# Patient Record
Sex: FEMALE | Race: WHITE
[De-identification: names, ages, dates, MRNs, and addresses within clinical notes are randomized per-mention and may not be internally consistent; named-entity substitution may affect disease eponyms.]

---

## 2022-03-19 PROBLEM — G50.0 TRIGEMINAL NEURALGIA OF LEFT SIDE OF FACE: Status: ACTIVE | Noted: 2019-05-28

## 2022-03-19 PROBLEM — Z82.49 FAMILY HISTORY OF CORONARY ARTERY DISEASE: Status: ACTIVE | Noted: 2019-09-11

## 2022-03-20 PROBLEM — R53.82 CHRONIC FATIGUE: Status: ACTIVE | Noted: 2019-09-11

## 2022-06-13 ENCOUNTER — PATIENT MESSAGE (OUTPATIENT)
Dept: INTERNAL MEDICINE CLINIC | Facility: CLINIC | Age: 52
End: 2022-06-13

## 2022-06-13 RX ORDER — CARBAMAZEPINE 200 MG/1
200 TABLET ORAL 3 TIMES DAILY
Qty: 90 TABLET | Refills: 3 | Status: CANCELLED | OUTPATIENT
Start: 2022-06-13

## 2022-06-13 NOTE — TELEPHONE ENCOUNTER
----- Message from Luismaurizio BahAlejandro sent at 6/13/2022  3:17 PM EDT -----  Subject: Refill Request    QUESTIONS  Name of Medication? carBAMazepine (TEGRETOL) 200 MG tablet  Patient-reported dosage and instructions? one tablet 3 times a day   How many days do you have left? 3  Preferred Pharmacy? [address] phone number (if available)? [phone]  ---------------------------------------------------------------------------  --------------  Gene Nancy BOYKIN  What is the best way for the office to contact you? OK to leave message on   voicemail  Preferred Call Back Phone Number? [phone]  ---------------------------------------------------------------------------  --------------  SCRIPT ANSWERS  Relationship to Patient?  Self

## 2022-06-14 RX ORDER — CARBAMAZEPINE 200 MG/1
200 TABLET ORAL 3 TIMES DAILY
Qty: 90 TABLET | Refills: 2 | Status: SHIPPED | OUTPATIENT
Start: 2022-06-14 | End: 2022-09-13 | Stop reason: SDUPTHER

## 2022-06-14 NOTE — TELEPHONE ENCOUNTER
From: Leslye Parrish  To: Dr. Stewart Fearin2022 4:40 PM EDT  Subject: Request for refill of carbamazepine    Hi Dr. Romayne Fortune,    I wanted to request a refill of the carbamazepine I am currently taking as I just noticed I am about to run out and I am out of refills. I am not needing to come in for a yearly appointment until around October, so wanted to see if you could refill until I plan to come in for yearly appt?  Thank you very much, Loy Bay

## 2022-12-13 ENCOUNTER — TELEPHONE (OUTPATIENT)
Dept: INTERNAL MEDICINE CLINIC | Facility: CLINIC | Age: 52
End: 2022-12-13

## 2022-12-13 ENCOUNTER — OFFICE VISIT (OUTPATIENT)
Dept: INTERNAL MEDICINE CLINIC | Facility: CLINIC | Age: 52
End: 2022-12-13
Payer: COMMERCIAL

## 2022-12-13 VITALS
TEMPERATURE: 97.5 F | WEIGHT: 122.4 LBS | HEIGHT: 67 IN | SYSTOLIC BLOOD PRESSURE: 155 MMHG | DIASTOLIC BLOOD PRESSURE: 89 MMHG | HEART RATE: 87 BPM | BODY MASS INDEX: 19.21 KG/M2 | RESPIRATION RATE: 18 BRPM

## 2022-12-13 DIAGNOSIS — G50.0 TRIGEMINAL NEURALGIA OF LEFT SIDE OF FACE: ICD-10-CM

## 2022-12-13 DIAGNOSIS — Z82.49 FAMILY HISTORY OF ISCHEMIC HEART DISEASE AND OTHER DISEASES OF THE CIRCULATORY SYSTEM: ICD-10-CM

## 2022-12-13 DIAGNOSIS — E55.9 VITAMIN D DEFICIENCY: ICD-10-CM

## 2022-12-13 DIAGNOSIS — Z00.00 ROUTINE GENERAL MEDICAL EXAMINATION AT A HEALTH CARE FACILITY: Primary | ICD-10-CM

## 2022-12-13 DIAGNOSIS — Z82.49 FAMILY HISTORY OF ISCHEMIC HEART DISEASE AND OTHER DISEASES OF THE CIRCULATORY SYSTEM: Primary | ICD-10-CM

## 2022-12-13 DIAGNOSIS — Z12.4 CERVICAL CANCER SCREENING: ICD-10-CM

## 2022-12-13 DIAGNOSIS — R53.82 CHRONIC FATIGUE, UNSPECIFIED: ICD-10-CM

## 2022-12-13 DIAGNOSIS — L82.1 SEBORRHEIC KERATOSES: ICD-10-CM

## 2022-12-13 PROBLEM — J44.9 CHRONIC OBSTRUCTIVE PULMONARY DISEASE, UNSPECIFIED (HCC): Status: ACTIVE | Noted: 2022-12-13

## 2022-12-13 PROCEDURE — 99396 PREV VISIT EST AGE 40-64: CPT | Performed by: INTERNAL MEDICINE

## 2022-12-13 RX ORDER — TRAMADOL HYDROCHLORIDE 50 MG/1
TABLET ORAL
COMMUNITY
Start: 2022-10-27 | End: 2022-12-13 | Stop reason: SDUPTHER

## 2022-12-13 RX ORDER — NORGESTIMATE AND ETHINYL ESTRADIOL 0.25-0.035
1 KIT ORAL DAILY
Qty: 1 PACKET | Refills: 5 | Status: SHIPPED | OUTPATIENT
Start: 2022-12-13

## 2022-12-13 RX ORDER — TRAMADOL HYDROCHLORIDE 50 MG/1
50 TABLET ORAL DAILY PRN
Qty: 30 TABLET | Refills: 5 | Status: SHIPPED | OUTPATIENT
Start: 2022-12-13 | End: 2023-01-12

## 2022-12-13 RX ORDER — CARBAMAZEPINE 200 MG/1
200 TABLET ORAL 3 TIMES DAILY
Qty: 90 TABLET | Refills: 5 | Status: SHIPPED | OUTPATIENT
Start: 2022-12-13

## 2022-12-13 RX ORDER — GABAPENTIN 300 MG/1
300 CAPSULE ORAL 2 TIMES DAILY
Qty: 60 CAPSULE | Refills: 1 | Status: SHIPPED | OUTPATIENT
Start: 2022-12-13 | End: 2023-02-11

## 2022-12-13 SDOH — ECONOMIC STABILITY: FOOD INSECURITY: WITHIN THE PAST 12 MONTHS, THE FOOD YOU BOUGHT JUST DIDN'T LAST AND YOU DIDN'T HAVE MONEY TO GET MORE.: NEVER TRUE

## 2022-12-13 SDOH — ECONOMIC STABILITY: FOOD INSECURITY: WITHIN THE PAST 12 MONTHS, YOU WORRIED THAT YOUR FOOD WOULD RUN OUT BEFORE YOU GOT MONEY TO BUY MORE.: NEVER TRUE

## 2022-12-13 ASSESSMENT — PATIENT HEALTH QUESTIONNAIRE - PHQ9
SUM OF ALL RESPONSES TO PHQ QUESTIONS 1-9: 0
1. LITTLE INTEREST OR PLEASURE IN DOING THINGS: 0
2. FEELING DOWN, DEPRESSED OR HOPELESS: 0
SUM OF ALL RESPONSES TO PHQ QUESTIONS 1-9: 0
SUM OF ALL RESPONSES TO PHQ QUESTIONS 1-9: 0
SUM OF ALL RESPONSES TO PHQ9 QUESTIONS 1 & 2: 0
SUM OF ALL RESPONSES TO PHQ QUESTIONS 1-9: 0

## 2022-12-13 ASSESSMENT — SOCIAL DETERMINANTS OF HEALTH (SDOH): HOW HARD IS IT FOR YOU TO PAY FOR THE VERY BASICS LIKE FOOD, HOUSING, MEDICAL CARE, AND HEATING?: NOT HARD AT ALL

## 2022-12-13 NOTE — TELEPHONE ENCOUNTER
Contact this patient. We forgot to discuss a referral to gastroenterology for a screening colonoscopy.   If she is agreeable please make a referral to [facility]

## 2022-12-13 NOTE — PROGRESS NOTES
12/13/2022 5:22 PM  Location:[facility] [address] [facility]INE  [place]  Patient #:  [id number]  YOB: 1970          YOUR LAST HEMOGLOBIN A1CS:   No results found for: HBA1C, [id number]    YOUR LAST LIPID PROFILE: No results found for: CHOL, CHOLPOCT, CHOLX, CHLST, CHOLV, HDL, HDLPOC, HDLC, LDL, LDLC, VLDLC, VLDL, TGLX, TRIGL      Lab Results   Component Value Date/Time    GFRAA 120 02/01/2022 03:10 PM    BUN 9 02/01/2022 03:10 PM     02/01/2022 03:10 PM    K 4.2 02/01/2022 03:10 PM    CL 99 02/01/2022 03:10 PM    CO2 24 02/01/2022 03:10 PM           History of Present Illness     Chief Complaint   Patient presents with    Annual Exam     Pt presents to the office today for their annual exam.      Discuss Medications     Want to discuss maybe starting on cholesterol medication due to family history of  high cholesterol and her dad had another heart attack over in the summer    Skin Problem     Have a place on the upper part of the chest that is starting to itch; would like to see if the doctor could freeze it off    Dental Pain     Want to see what the doctor thoughts are about increasing the tegretol or is there another medication she might can try for the neuralgia      And this patient is here for her annual preventive screen. She continues to have left facial pain which is thought to be due to trigeminal neuralgia in the past.  She has had a full ENT and neurologic work-up and has been maintained on Tegretol 600 mg a day. She has tried taking an extra 1 which has helped some. She has been with Lyrica and gabapentin in the distant past but does not remember if there were any side effects from these. She has a small skin lesion on her anterior chest which is bothersome. Of note is that her father experienced a myocardial infarction at age 76 this summer. He had had a previous MI in his mid 46s and she is concerned about her cardiovascular risk.   She denies chest pain or shortness of breath. She continues to be on birth control and does have regular menstrual periods and denies any hot flashes. This patient presents for the annual preventive exam. Patient denies any problems other than what is noted  In ROS     Ms. Deepthi Briceño is a 47 y.o. female  who presents for ov      No Known Allergies  Past Medical History:   Diagnosis Date    Calculus of kidney      Social History     Socioeconomic History    Marital status:      Spouse name: None    Number of children: None    Years of education: None    Highest education level: None   Tobacco Use    Smoking status: Never    Smokeless tobacco: Never   Substance and Sexual Activity    Alcohol use: Yes    Drug use: No     Social Determinants of Health     Financial Resource Strain: Low Risk     Difficulty of Paying Living Expenses: Not hard at all   Food Insecurity: No Food Insecurity    Worried About [email or web address] in the Last Year: Never true    [facility] in the Last Year: Never true     Past Surgical History:   Procedure Laterality Date    BREAST SURGERY  2006    DILATION AND CURETTAGE OF UTERUS      LITHOTRIPSY  08/2010     Current Outpatient Medications   Medication Sig Dispense Refill    carBAMazepine (TEGRETOL) 200 MG tablet Take 1 tablet by mouth 3 times daily 90 tablet 5    traMADol (ULTRAM) 50 MG tablet Take 1 tablet by mouth daily as needed for Pain for up to 30 days. 30 tablet 5    norgestimate-ethinyl estradiol (ORTHO-CYCLEN) 0.25-35 MG-MCG per tablet Take 1 tablet by mouth daily 1 packet 5    gabapentin (NEURONTIN) 300 MG capsule Take 1 capsule by mouth 2 times daily for 60 days. Intended supply: 90 days 60 capsule 1    nystatin-triamcinolone (MYCOLOG II) 733578-5.1 UNIT/GM-% cream Apply topically 2 times daily      traMADol (ULTRAM) 50 MG tablet Take 1 tablet by mouth daily as needed for Pain for up to 30 days. 30 tablet 0     No current facility-administered medications for this visit.      Health Maintenance   Topic Date Due    HIV screen  Never done    Hepatitis C screen  Never done    DTaP/Tdap/Td vaccine (1 - Tdap) Never done    Colorectal Cancer Screen  Never done    COVID-19 Vaccine (3 - Booster for Pfizer series) 06/16/2021    Shingles vaccine (1 of 2) Never done    Flu vaccine (1) 08/01/2022    Depression Screen  10/06/2022    Breast cancer screen  01/14/2023    Cervical cancer screen  09/12/2024    Lipids  03/23/2027    Hepatitis A vaccine  Aged Out    Hib vaccine  Aged Out    Meningococcal (ACWY) vaccine  Aged Out    Pneumococcal 0-64 years Vaccine  Aged Out     Family History   Problem Relation Age of Onset    Heart Attack Father         before age 61    Heart Disease Father              Review of Systems  Review of Systems    BP (!) 155/89 (Site: Left Upper Arm, Position: Standing, Cuff Size: Medium Adult)   Pulse 87   Temp 97.5 °F (36.4 °C) (Temporal)   Resp 18   Ht 5' 7\" (1.702 m)   Wt 122 lb 6.4 oz (55.5 kg)   BMI 19.17 kg/m²       Physical Exam    Physical Exam  Exam conducted with a chaperone present. Constitutional:       General: She is not in acute distress. Appearance: Normal appearance. She is not ill-appearing. HENT:      Head: Normocephalic and atraumatic. Right Ear: Tympanic membrane, ear canal and external ear normal.      Left Ear: Tympanic membrane, ear canal and external ear normal.      Mouth/Throat:      Mouth: Mucous membranes are moist.      Pharynx: Oropharynx is clear. No oropharyngeal exudate or posterior oropharyngeal erythema. Eyes:      General: No scleral icterus. Extraocular Movements: Extraocular movements intact. Conjunctiva/sclera: Conjunctivae normal.      Pupils: Pupils are equal, round, and reactive to light. Neck:      Vascular: No carotid bruit. Cardiovascular:      Rate and Rhythm: Normal rate and regular rhythm. Heart sounds: Normal heart sounds. No murmur heard.   Pulmonary:      Effort: Pulmonary effort is normal. Breath sounds: Normal breath sounds. Chest:   Breasts:     Right: Normal. No mass. Left: Normal. No mass. Comments: Breast prostheses noted  Abdominal:      General: Bowel sounds are normal. There is no distension. Palpations: Abdomen is soft. There is no mass. Tenderness: There is no abdominal tenderness. There is no guarding or rebound. Hernia: No hernia is present. Genitourinary:     General: Normal vulva. Labia:         Right: No rash, tenderness or lesion. Left: No rash, tenderness or lesion. Urethra: No prolapse or urethral lesion. Vagina: Normal. No vaginal discharge. Cervix: No discharge, friability, lesion, erythema or cervical bleeding. Uterus: Normal.       Adnexa: Right adnexa normal and left adnexa normal.   Musculoskeletal:         General: No swelling, tenderness, deformity or signs of injury. Normal range of motion. Cervical back: Normal range of motion and neck supple. No rigidity or tenderness. Right lower leg: No edema. Left lower leg: No edema. Lymphadenopathy:      Cervical: No cervical adenopathy. Skin:     General: Skin is warm and dry. Comments: 2 small brown keratoses on upper chest  Small brown  lesions identified on chest.  Using liquid nitrogen this area was sprayed for approximately 3 seconds patient and the patient tolerated this well     Neurological:      General: No focal deficit present. Mental Status: She is alert and oriented to person, place, and time. Psychiatric:         Mood and Affect: Mood normal.         Behavior: Behavior normal.         Thought Content: Thought content normal.         Judgment: Judgment normal.       Assessment & Plan    Encounter Diagnoses   Name Primary?     Routine general medical examination at a health care facility Yes    Trigeminal neuralgia of left side of face     Vitamin D deficiency     Family history of ischemic heart disease and other diseases of the circulatory system     Chronic fatigue, unspecified     Cervical cancer screening     Seborrheic keratoses        Current Outpatient Medications   Medication Sig Dispense Refill    carBAMazepine (TEGRETOL) 200 MG tablet Take 1 tablet by mouth 3 times daily 90 tablet 5    traMADol (ULTRAM) 50 MG tablet Take 1 tablet by mouth daily as needed for Pain for up to 30 days. 30 tablet 5    norgestimate-ethinyl estradiol (ORTHO-CYCLEN) 0.25-35 MG-MCG per tablet Take 1 tablet by mouth daily 1 packet 5    gabapentin (NEURONTIN) 300 MG capsule Take 1 capsule by mouth 2 times daily for 60 days. Intended supply: 90 days 60 capsule 1    nystatin-triamcinolone (MYCOLOG II) 117911-3.1 UNIT/GM-% cream Apply topically 2 times daily      traMADol (ULTRAM) 50 MG tablet Take 1 tablet by mouth daily as needed for Pain for up to 30 days. 30 tablet 0     No current facility-administered medications for this visit. Orders Placed This Encounter   Procedures    CT CARDIAC CALCIUM SCORING     Standing Status:   Future     Standing Expiration Date:   12/13/2023     Scheduling Instructions:      Mt view    PAP IGP,Aptima HPV Age Guideline     Standing Status:   Future     Number of Occurrences:   1     Standing Expiration Date:   12/13/2023     Order Specific Question:   Pap Source? (Required)     Answer:   cervical     Order Specific Question:   Pap Source? (Required)     Answer:   endocervical     Order Specific Question:   Pap collection method? (Required     Answer:   brush     Order Specific Question:   Pap collection method? (Required     Answer:   spatula     Order Specific Question:   Date of LMP? (Required)     Answer:   11/22/2022     Order Specific Question:   Previous Treatment?           (Required)     Answer:   NONE       Medications Discontinued During This Encounter   Medication Reason    carBAMazepine (TEGRETOL) 200 MG tablet REORDER    norgestimate-ethinyl estradiol (ORTHO-CYCLEN) 0.25-35 MG-MCG per tablet REORDER    traMADol (ULTRAM) 50 MG tablet REORDER       1. Routine general medical examination at a health care facility       2. Trigeminal neuralgia of left side of face  Only takes tramadol rarely as needed. We will low-dose gabapentin and titrate to see if this gives her any benefit. - traMADol (ULTRAM) 50 MG tablet; Take 1 tablet by mouth daily as needed for Pain for up to 30 days. Dispense: 30 tablet; Refill: 5    3. Vitamin D deficiency       4. Family history of ischemic heart disease and other diseases of the circulatory system  We will begin the evaluation of her cardiovascular risk with a cardiac CRP and a cardiac calcium score  - CT CARDIAC CALCIUM SCORING; Future    5. Chronic fatigue, unspecified       6. Cervical cancer screening  Pap obtained  - PAP IGP,Aptima HPV Age Guideline; Future  - PAP IGP,Aptima HPV Age Guideline    7. Seborrheic keratoses  Anterior chest lesions treated with cryotherapy      No follow-up provider specified. Pt is given instructions/counseling on proper diet and exercise to maintain an appropriate body mass index, to keep his cardiovascular status stable or improve, and discussed importance of compliance with medications as prescribed. Also is encouraged to see his MD at least annually for evaluation of his medical status and monitoring timely screening tests recommended as well as vaccinations.      Lillie Mcdaniel MD

## 2022-12-14 NOTE — TELEPHONE ENCOUNTER
Per pt she is wanting to go to another doctor that her  went too in [facility].  She will contact the office with the information so CMS can do the referral.anc

## 2022-12-18 LAB
AGE GDLN ACOG TESTING: NORMAL
CYTOLOGIST CVX/VAG CYTO: NORMAL
CYTOLOGY CVX/VAG DOC THIN PREP: NORMAL
HPV APTIMA: NEGATIVE
HPV GENOTYPE REFLEX: NORMAL
Lab: NORMAL
PATH REPORT.FINAL DX SPEC: NORMAL
STAT OF ADQ CVX/VAG CYTO-IMP: NORMAL

## 2023-01-24 DIAGNOSIS — Z82.49 FAMILY HISTORY OF ISCHEMIC HEART DISEASE AND OTHER DISEASES OF THE CIRCULATORY SYSTEM: ICD-10-CM

## 2023-01-26 LAB — CRP SERPL HS-MCNC: 1.75 MG/L (ref 0–3)

## 2023-04-03 ENCOUNTER — PATIENT MESSAGE (OUTPATIENT)
Dept: INTERNAL MEDICINE CLINIC | Facility: CLINIC | Age: 53
End: 2023-04-03

## 2023-04-04 RX ORDER — GABAPENTIN 300 MG/1
300 CAPSULE ORAL 2 TIMES DAILY
Qty: 60 CAPSULE | Refills: 11 | Status: SHIPPED | OUTPATIENT
Start: 2023-04-04 | End: 2023-05-04

## 2023-04-04 NOTE — TELEPHONE ENCOUNTER
From: Mary Jo Renee  To: Dr. Black Beam: 4/3/2023 6:32 PM EDT  Subject: Request refill of Gabapentin    Hi Dr. Paris Lr,    Last time I visited your office you prescribed gabapentin for me to take along with the carbamazepine I have been taking. You only gave me 2 refills so I wanted to request additional refills. The combination seems to be helpful.      Thank you,  Silvina Ramesh

## 2023-06-22 ENCOUNTER — PATIENT MESSAGE (OUTPATIENT)
Dept: INTERNAL MEDICINE CLINIC | Facility: CLINIC | Age: 53
End: 2023-06-22

## 2023-06-22 RX ORDER — CARBAMAZEPINE 200 MG/1
200 TABLET ORAL 3 TIMES DAILY
Qty: 90 TABLET | Refills: 11 | Status: SHIPPED | OUTPATIENT
Start: 2023-06-22

## 2023-06-22 NOTE — TELEPHONE ENCOUNTER
From: Tanja Heller  To: Dr. Eyad Hanna: 6/22/2023 11:58 AM EDT  Subject: Carbamazepine refill request    Hi Dr. Joselin Paulino,    I just realized that today was my last day of carbamazepine tablets. I wanted to request refills of this medication hopefully atleast until my next yearly appt with you.     Thank you for your help,  Kary Marquez

## 2023-07-13 DIAGNOSIS — G50.0 TRIGEMINAL NEURALGIA OF LEFT SIDE OF FACE: ICD-10-CM

## 2023-07-13 RX ORDER — TRAMADOL HYDROCHLORIDE 50 MG/1
50 TABLET ORAL DAILY PRN
Qty: 30 TABLET | Refills: 5 | Status: SHIPPED | OUTPATIENT
Start: 2023-07-13 | End: 2024-01-09

## 2023-09-29 ENCOUNTER — PATIENT MESSAGE (OUTPATIENT)
Dept: INTERNAL MEDICINE CLINIC | Facility: CLINIC | Age: 53
End: 2023-09-29

## 2023-09-29 RX ORDER — NORGESTIMATE AND ETHINYL ESTRADIOL 0.25-0.035
1 KIT ORAL DAILY
Qty: 1 PACKET | Refills: 11 | Status: SHIPPED | OUTPATIENT
Start: 2023-09-29

## 2023-09-29 NOTE — TELEPHONE ENCOUNTER
From: Jen Mohamud  To: Dr. Friend McCracken: 2023 10:09 AM EDT  Subject: Regi Abernathy refill    Hi Dr. Loli Chakraborty,    I wanted to request refills on my Sprintec medication.  I am out of refills, but my next office visit is not scheduled until in Dec.     Thank you and hope you have a nice weekend,  [employer]

## 2024-04-09 SDOH — ECONOMIC STABILITY: FOOD INSECURITY: WITHIN THE PAST 12 MONTHS, YOU WORRIED THAT YOUR FOOD WOULD RUN OUT BEFORE YOU GOT MONEY TO BUY MORE.: NEVER TRUE

## 2024-04-09 SDOH — ECONOMIC STABILITY: FOOD INSECURITY: WITHIN THE PAST 12 MONTHS, THE FOOD YOU BOUGHT JUST DIDN'T LAST AND YOU DIDN'T HAVE MONEY TO GET MORE.: NEVER TRUE

## 2024-04-09 SDOH — ECONOMIC STABILITY: TRANSPORTATION INSECURITY
IN THE PAST 12 MONTHS, HAS LACK OF TRANSPORTATION KEPT YOU FROM MEETINGS, WORK, OR FROM GETTING THINGS NEEDED FOR DAILY LIVING?: NO

## 2024-04-09 SDOH — ECONOMIC STABILITY: INCOME INSECURITY: HOW HARD IS IT FOR YOU TO PAY FOR THE VERY BASICS LIKE FOOD, HOUSING, MEDICAL CARE, AND HEATING?: NOT HARD AT ALL

## 2024-04-09 SDOH — ECONOMIC STABILITY: HOUSING INSECURITY
IN THE LAST 12 MONTHS, WAS THERE A TIME WHEN YOU DID NOT HAVE A STEADY PLACE TO SLEEP OR SLEPT IN A SHELTER (INCLUDING NOW)?: NO

## 2024-04-09 ASSESSMENT — PATIENT HEALTH QUESTIONNAIRE - PHQ9
SUM OF ALL RESPONSES TO PHQ QUESTIONS 1-9: 0
1. LITTLE INTEREST OR PLEASURE IN DOING THINGS: NOT AT ALL
2. FEELING DOWN, DEPRESSED OR HOPELESS: NOT AT ALL
SUM OF ALL RESPONSES TO PHQ9 QUESTIONS 1 & 2: 0
SUM OF ALL RESPONSES TO PHQ9 QUESTIONS 1 & 2: 0
SUM OF ALL RESPONSES TO PHQ QUESTIONS 1-9: 0
1. LITTLE INTEREST OR PLEASURE IN DOING THINGS: NOT AT ALL
SUM OF ALL RESPONSES TO PHQ QUESTIONS 1-9: 0
SUM OF ALL RESPONSES TO PHQ QUESTIONS 1-9: 0
2. FEELING DOWN, DEPRESSED OR HOPELESS: NOT AT ALL

## 2024-04-12 ENCOUNTER — OFFICE VISIT (OUTPATIENT)
Dept: INTERNAL MEDICINE CLINIC | Facility: CLINIC | Age: 54
End: 2024-04-12

## 2024-04-12 VITALS
DIASTOLIC BLOOD PRESSURE: 72 MMHG | HEIGHT: 67 IN | RESPIRATION RATE: 16 BRPM | SYSTOLIC BLOOD PRESSURE: 127 MMHG | WEIGHT: 128.2 LBS | BODY MASS INDEX: 20.12 KG/M2 | HEART RATE: 85 BPM | TEMPERATURE: 97.1 F

## 2024-04-12 DIAGNOSIS — G50.0 TRIGEMINAL NEURALGIA OF LEFT SIDE OF FACE: ICD-10-CM

## 2024-04-12 DIAGNOSIS — R53.82 CHRONIC FATIGUE: ICD-10-CM

## 2024-04-12 DIAGNOSIS — Z78.0 MENOPAUSE: ICD-10-CM

## 2024-04-12 DIAGNOSIS — Z82.49 FAMILY HISTORY OF CORONARY ARTERY DISEASE: Primary | ICD-10-CM

## 2024-04-12 DIAGNOSIS — E55.9 VITAMIN D DEFICIENCY: ICD-10-CM

## 2024-04-12 DIAGNOSIS — Z00.00 ROUTINE GENERAL MEDICAL EXAMINATION AT A HEALTH CARE FACILITY: ICD-10-CM

## 2024-04-12 RX ORDER — CARBAMAZEPINE 200 MG/1
200 TABLET ORAL 3 TIMES DAILY
Qty: 90 TABLET | Refills: 11 | Status: SHIPPED | OUTPATIENT
Start: 2024-04-12

## 2024-04-12 RX ORDER — GABAPENTIN 300 MG/1
300 CAPSULE ORAL 2 TIMES DAILY
Qty: 60 CAPSULE | Refills: 11 | Status: SHIPPED | OUTPATIENT
Start: 2024-04-12 | End: 2025-04-07

## 2024-04-12 RX ORDER — NORGESTIMATE AND ETHINYL ESTRADIOL 0.25-0.035
1 KIT ORAL DAILY
Qty: 1 PACKET | Refills: 11 | Status: SHIPPED | OUTPATIENT
Start: 2024-04-12

## 2024-04-12 RX ORDER — TRAMADOL HYDROCHLORIDE 50 MG/1
50 TABLET ORAL DAILY PRN
Qty: 30 TABLET | Refills: 5 | Status: SHIPPED | OUTPATIENT
Start: 2024-04-12 | End: 2024-10-09

## 2024-04-12 NOTE — PROGRESS NOTES
motion and neck supple. No rigidity or tenderness.      Right lower leg: No edema.      Left lower leg: No edema.   Lymphadenopathy:      Cervical: No cervical adenopathy.   Skin:     General: Skin is warm and dry.   Neurological:      General: No focal deficit present.      Mental Status: She is alert and oriented to person, place, and time.   Psychiatric:         Mood and Affect: Mood normal.         Behavior: Behavior normal.         Thought Content: Thought content normal.         Judgment: Judgment normal.       Assessment & Plan    Encounter Diagnoses   Name Primary?   • Family history of coronary artery disease Yes   • Routine general medical examination at a health care facility    • Trigeminal neuralgia of left side of face    • Chronic fatigue    • Vitamin D deficiency    • Menopause        Current Outpatient Medications   Medication Sig Dispense Refill   • carBAMazepine (TEGRETOL) 200 MG tablet Take 1 tablet by mouth 3 times daily 90 tablet 11   • gabapentin (NEURONTIN) 300 MG capsule Take 1 capsule by mouth 2 times daily for 360 days. 60 capsule 11   • traMADol (ULTRAM) 50 MG tablet Take 1 tablet by mouth daily as needed for Pain for up to 180 days. Max Daily Amount: 50 mg 30 tablet 5   • norgestimate-ethinyl estradiol (ORTHO-CYCLEN) 0.25-35 MG-MCG per tablet Take 1 tablet by mouth daily 1 packet 11   • nystatin-triamcinolone (MYCOLOG II) 873527-8.1 UNIT/GM-% cream Apply topically 2 times daily       No current facility-administered medications for this visit.       Orders Placed This Encounter   Procedures   • Comprehensive Metabolic Panel   • CBC with Auto Differential   • Lipid Panel     Order Specific Question:   Is Patient Fasting?/# of Hours     Answer:   12   • Vitamin D 25 Hydroxy       Medications Discontinued During This Encounter   Medication Reason   • traMADol (ULTRAM) 50 MG tablet LIST CLEANUP   • gabapentin (NEURONTIN) 300 MG capsule REORDER   • carBAMazepine (TEGRETOL) 200 MG tablet

## 2024-05-03 ENCOUNTER — PATIENT MESSAGE (OUTPATIENT)
Dept: INTERNAL MEDICINE CLINIC | Facility: CLINIC | Age: 54
End: 2024-05-03

## 2024-05-03 ENCOUNTER — TELEPHONE (OUTPATIENT)
Dept: INTERNAL MEDICINE CLINIC | Facility: CLINIC | Age: 54
End: 2024-05-03

## 2024-05-03 NOTE — TELEPHONE ENCOUNTER
Called the pharmacy and per the pharmacist the pt has to fill a 7 day rx then fill the 30 day rx to show that the medication is needed long term.  She wouldn't be able to run a claim through the pt's insurance until the 7 days is up.  Left msg on pt's voicemail    Per the insurance company the medication is covered under the pt's formulary plan.

## 2024-05-03 NOTE — TELEPHONE ENCOUNTER
Drug is covered by current benefit plan. No further PA activity needed   Pt notified via [employer].

## 2024-05-03 NOTE — TELEPHONE ENCOUNTER
From: Ofelia Dunne  Sent: 5/3/2024 3:32 PM EDT  To: [facility] Clinical Staff  Subject: Request PA (pre authorization) on Tramadol     I actually filled the prescription mid April and received 7 tablets and then went back yesterday for refill and received 7 tablets. The pharmacist said they could still only give me 7 at a time even though I had just filled prescription around April 14. She said insurance company would still only allow 7 tablets at a time unless they had PA from doctor. I have been on Tramadol for awhile and every time I only receive 7 tablets. It is just causing me to have to go to pharmacy multiple times a month. I am going to [facility] in [place] but I had this same issue when I was going to the [facility] pharmacy previously. Is there anyway you could call [facility] pharmacy at [phone] to help with this situation as there seems to be a misunderstanding somewhere? I would greatly appreciate the help.

## 2024-10-25 ENCOUNTER — TELEMEDICINE (OUTPATIENT)
Dept: INTERNAL MEDICINE CLINIC | Facility: CLINIC | Age: 54
End: 2024-10-25
Payer: COMMERCIAL

## 2024-10-25 DIAGNOSIS — J40 BRONCHITIS: Primary | ICD-10-CM

## 2024-10-25 PROCEDURE — 99213 OFFICE O/P EST LOW 20 MIN: CPT | Performed by: NURSE PRACTITIONER

## 2024-10-25 RX ORDER — HYDROCODONE BITARTRATE AND HOMATROPINE METHYLBROMIDE ORAL SOLUTION 5; 1.5 MG/5ML; MG/5ML
2.5 LIQUID ORAL 4 TIMES DAILY PRN
Qty: 50 ML | Refills: 0 | Status: SHIPPED | OUTPATIENT
Start: 2024-10-25 | End: 2024-10-30

## 2024-10-25 RX ORDER — DOXYCYCLINE HYCLATE 100 MG
100 TABLET ORAL 2 TIMES DAILY
Qty: 14 TABLET | Refills: 0 | Status: SHIPPED | OUTPATIENT
Start: 2024-10-25 | End: 2024-11-01

## 2024-10-25 RX ORDER — METHYLPREDNISOLONE 4 MG/1
TABLET ORAL
Qty: 1 KIT | Refills: 0 | Status: SHIPPED | OUTPATIENT
Start: 2024-10-25 | End: 2024-10-31

## 2024-10-25 ASSESSMENT — ENCOUNTER SYMPTOMS
CHEST TIGHTNESS: 0
SHORTNESS OF BREATH: 0
WHEEZING: 0
RHINORRHEA: 1
SINUS PAIN: 1
ABDOMINAL PAIN: 0
SINUS PRESSURE: 1
COUGH: 1

## 2024-10-25 NOTE — PROGRESS NOTES
10/25/2024 4:31 PM  Location:[facility] PHYSIC[facility] SERVICES  [facility]  [place]  Patient #:  [id number]  YOB: 1970        History of Present Illness     Chief Complaint   Patient presents with    Cough     Patient c/o productive cough x 2 weeks       Ms. Dunne is a 53 y.o. female  who presents for vv on video. Has had a cough for 2 weeks. Sputum is clear. No sob or wheezing.  Having fatigue, no f/c/ba. She is sleeping a lot but also cough is waking her up at night.   Hx of copd, trigeminal neuralgia.       No Known Allergies  Past Medical History:   Diagnosis Date    Calculus of kidney      Social History     Socioeconomic History    Marital status:      Spouse name: None    Number of children: None    Years of education: None    Highest education level: None   Tobacco Use    Smoking status: Never    Smokeless tobacco: Never   Substance and Sexual Activity    Alcohol use: Yes     Comment: drink very little on rare occasions    Drug use: No    Sexual activity: Yes     Partners: Male     Social Determinants of Health     Financial Resource Strain: Low Risk  (4/9/2024)    Overall Financial Resource Strain (CARDIA)     Difficulty of Paying Living Expenses: Not hard at all   Food Insecurity: No Food Insecurity (4/9/2024)    Hunger Vital Sign     Worried About Running Out of Food in the Last Year: Never true     Ran Out of Food in the Last Year: Never true   Transportation Needs: Unknown (4/9/2024)    PRAPARE - Transportation     Lack of Transportation (Non-Medical): No   Social Connections: Unknown (3/20/2021)    Received from Heidi Shaulis    Social Connections     Frequency of Communication with Friends and Family: Not asked     Frequency of Social Gatherings with Friends and Family: Not asked   Intimate Partner Violence: Unknown (3/20/2021)    Received from [facility]idi Shaulis    Intimate Partner Violence     Fear of Current or Ex-Partner: Not asked

## 2024-11-02 ENCOUNTER — PATIENT MESSAGE (OUTPATIENT)
Dept: INTERNAL MEDICINE CLINIC | Facility: CLINIC | Age: 54
End: 2024-11-02

## 2024-11-26 ENCOUNTER — TELEPHONE (OUTPATIENT)
Dept: INTERNAL MEDICINE CLINIC | Facility: CLINIC | Age: 54
End: 2024-11-26

## 2024-11-26 DIAGNOSIS — J40 BRONCHITIS: Primary | ICD-10-CM

## 2024-11-26 RX ORDER — METHYLPREDNISOLONE 4 MG/1
TABLET ORAL
Qty: 1 KIT | Refills: 0 | Status: SHIPPED | OUTPATIENT
Start: 2024-11-26 | End: 2024-12-02

## 2024-11-27 DIAGNOSIS — J40 BRONCHITIS: ICD-10-CM

## 2024-11-29 ENCOUNTER — TELEPHONE (OUTPATIENT)
Dept: INTERNAL MEDICINE CLINIC | Facility: CLINIC | Age: 54
End: 2024-11-29

## 2024-12-02 NOTE — TELEPHONE ENCOUNTER
3x attempts to contact patient with no answer.  [employer] message sent with providers message.  Closing encounter.

## 2025-04-07 RX ORDER — GABAPENTIN 300 MG/1
300 CAPSULE ORAL 2 TIMES DAILY
Qty: 60 CAPSULE | Refills: 11 | Status: SHIPPED | OUTPATIENT
Start: 2025-04-07 | End: 2025-05-07

## 2025-04-14 ASSESSMENT — PATIENT HEALTH QUESTIONNAIRE - PHQ9
1. LITTLE INTEREST OR PLEASURE IN DOING THINGS: NOT AT ALL
SUM OF ALL RESPONSES TO PHQ QUESTIONS 1-9: 0
SUM OF ALL RESPONSES TO PHQ9 QUESTIONS 1 & 2: 0
1. LITTLE INTEREST OR PLEASURE IN DOING THINGS: NOT AT ALL
SUM OF ALL RESPONSES TO PHQ QUESTIONS 1-9: 0
SUM OF ALL RESPONSES TO PHQ QUESTIONS 1-9: 0
2. FEELING DOWN, DEPRESSED OR HOPELESS: NOT AT ALL
SUM OF ALL RESPONSES TO PHQ QUESTIONS 1-9: 0
2. FEELING DOWN, DEPRESSED OR HOPELESS: NOT AT ALL

## 2025-04-15 ENCOUNTER — OFFICE VISIT (OUTPATIENT)
Dept: INTERNAL MEDICINE CLINIC | Facility: CLINIC | Age: 55
End: 2025-04-15
Payer: COMMERCIAL

## 2025-04-15 VITALS
TEMPERATURE: 97.9 F | HEIGHT: 67 IN | HEART RATE: 88 BPM | BODY MASS INDEX: 19.68 KG/M2 | DIASTOLIC BLOOD PRESSURE: 57 MMHG | WEIGHT: 125.4 LBS | RESPIRATION RATE: 16 BRPM | SYSTOLIC BLOOD PRESSURE: 130 MMHG

## 2025-04-15 DIAGNOSIS — G50.0 TRIGEMINAL NEURALGIA OF LEFT SIDE OF FACE: Primary | ICD-10-CM

## 2025-04-15 DIAGNOSIS — Z12.4 CERVICAL CANCER SCREENING: ICD-10-CM

## 2025-04-15 DIAGNOSIS — E55.9 VITAMIN D DEFICIENCY: ICD-10-CM

## 2025-04-15 DIAGNOSIS — Z82.49 FAMILY HISTORY OF CORONARY ARTERY DISEASE: ICD-10-CM

## 2025-04-15 DIAGNOSIS — Z00.00 ROUTINE GENERAL MEDICAL EXAMINATION AT A HEALTH CARE FACILITY: ICD-10-CM

## 2025-04-15 DIAGNOSIS — E78.2 MIXED HYPERLIPIDEMIA: ICD-10-CM

## 2025-04-15 PROCEDURE — 99396 PREV VISIT EST AGE 40-64: CPT | Performed by: INTERNAL MEDICINE

## 2025-04-15 RX ORDER — CARBAMAZEPINE 200 MG/1
200 TABLET ORAL 3 TIMES DAILY
Qty: 90 TABLET | Refills: 11 | Status: SHIPPED | OUTPATIENT
Start: 2025-04-15

## 2025-04-15 RX ORDER — TRAMADOL HYDROCHLORIDE 50 MG/1
50 TABLET ORAL DAILY PRN
Qty: 30 TABLET | Refills: 5 | Status: SHIPPED | OUTPATIENT
Start: 2025-04-15 | End: 2025-10-12

## 2025-04-15 RX ORDER — NYSTATIN AND TRIAMCINOLONE ACETONIDE 100000; 1 [USP'U]/G; MG/G
CREAM TOPICAL 2 TIMES DAILY
Qty: 60 G | Refills: 5 | Status: SHIPPED | OUTPATIENT
Start: 2025-04-15

## 2025-04-15 SDOH — ECONOMIC STABILITY: FOOD INSECURITY: WITHIN THE PAST 12 MONTHS, THE FOOD YOU BOUGHT JUST DIDN'T LAST AND YOU DIDN'T HAVE MONEY TO GET MORE.: NEVER TRUE

## 2025-04-15 SDOH — ECONOMIC STABILITY: FOOD INSECURITY: WITHIN THE PAST 12 MONTHS, YOU WORRIED THAT YOUR FOOD WOULD RUN OUT BEFORE YOU GOT MONEY TO BUY MORE.: NEVER TRUE

## 2025-04-15 NOTE — PROGRESS NOTES
4/15/2025 1:32 PM  Location:[facility] PHYSICIAN SERVICES  [facility]  [place]  Patient #:  [id number]  YOB: 1970          YOUR LAST HEMOGLOBIN A1CS:   No results found for: \"HBA1C\", \"[id number]\"    YOUR LAST LIPID PROFILE: No results found for: \"CHOL\", \"CHOLPOCT\", \"CHLST\", \"CHOLV\", \"HDL\", \"HDLPOC\", \"HDLC\", \"LDL\", \"LDLC\", \"VLDLC\", \"VLDL\"      Lab Results   Component Value Date/Time    GFRAA 120 02/01/2022 03:10 PM    BUN 9 02/01/2022 03:10 PM     02/01/2022 03:10 PM    K 4.2 02/01/2022 03:10 PM    CL 99 02/01/2022 03:10 PM    CO2 24 02/01/2022 03:10 PM           History of Present Illness     Chief Complaint   Patient presents with    Annual Exam     Pt presents to the office today for their annual exam.      Blood Work     Would like to discuss her cholesterol ; it was elevated the last time    Skin Problem     Have some places on her left knee and back she would like to see if the doctor can freeze off.    Vaginal Burning     When she take a shower when the water hits it burns and stings   This patient presents for the annual preventive exam. Patient denies any problems other than what is noted  In ROS   She continues to have problems with discomfort in the left facial area due to trigeminal neuralgia.  Occasionally she does take tramadol when the pain is more significant.  Overall it seems to have improved slightly.  She has noted a burning sensation in her vaginal area during showering.  No drainage or bleeding or other discomfort is noted.  She has not had any.  And over 18 months.  Prior to that she was on birth control    Ms. Dunne is a 54 y.o. female  who presents for annual preventative visit      No Known Allergies  Past Medical History:   Diagnosis Date    Calculus of kidney      Social History     Socioeconomic History    Marital status:      Spouse name: None    Number of children: None    Years of education: None    Highest education level: None   Tobacco

## 2025-04-23 LAB
Lab: NORMAL
Lab: NORMAL
REFERENCE LAB: NORMAL

## 2025-04-29 ENCOUNTER — RESULTS FOLLOW-UP (OUTPATIENT)
Dept: INTERNAL MEDICINE CLINIC | Facility: CLINIC | Age: 55
End: 2025-04-29